# Patient Record
Sex: MALE | Race: WHITE | NOT HISPANIC OR LATINO | URBAN - METROPOLITAN AREA
[De-identification: names, ages, dates, MRNs, and addresses within clinical notes are randomized per-mention and may not be internally consistent; named-entity substitution may affect disease eponyms.]

---

## 2020-08-04 ENCOUNTER — OFFICE VISIT (OUTPATIENT)
Dept: CARDIAC SURGERY | Facility: CLINIC | Age: 45
End: 2020-08-04

## 2020-08-04 VITALS
WEIGHT: 176 LBS | HEIGHT: 68 IN | HEART RATE: 122 BPM | DIASTOLIC BLOOD PRESSURE: 84 MMHG | SYSTOLIC BLOOD PRESSURE: 134 MMHG | BODY MASS INDEX: 26.67 KG/M2 | OXYGEN SATURATION: 98 % | TEMPERATURE: 97.8 F

## 2020-08-04 DIAGNOSIS — I73.9 PVD (PERIPHERAL VASCULAR DISEASE) WITH CLAUDICATION (HCC): Primary | ICD-10-CM

## 2020-08-04 PROCEDURE — 99202 OFFICE O/P NEW SF 15 MIN: CPT | Performed by: THORACIC SURGERY (CARDIOTHORACIC VASCULAR SURGERY)

## 2020-08-04 RX ORDER — LISINOPRIL 10 MG/1
10 TABLET ORAL DAILY
COMMUNITY

## 2020-08-04 RX ORDER — CILOSTAZOL 100 MG/1
100 TABLET ORAL 2 TIMES DAILY
COMMUNITY

## 2020-08-04 NOTE — PROGRESS NOTES
08/04/2020  Patient Information  [facility]  [place] [address]   1975  'PCP/Referring Physician'  Gayla Blair APRN  [phone]  Gayla Blair, *  [phone]  Chief Complaint   Patient presents with   • Consult     Np referred for peripheral vascular disease,complains of constant bilateral leg pain,muscle cramps and numbness in feet.   • Peripheral Vascular Disease       History of Present Illness: 45-year-old  male with a history of hypertension, diabetes mellitus, right lower extremity DVT and active tobacco abuse who presents with bilateral lower extremity pain.  For the past 6 months, the patient notes worsening bilateral (R=L) crampy pain from the mid thigh down to the feet.  This pain is exacerbated with ambulating approximately 100 feet and alleviated with rest.  He has associated numbness in the bilateral feet.  Mr. Chow occasionally wakes up at night with lower extremity pain.  His primary care provider, DIAMOND Wright appropriately placed the patient on Pletal for his peripheral vascular disease and he has noticed some improvement.  The patient is tachycardic on presentation today and also notes occasional mild chest pain.      There is no problem list on file for this patient.    Past Medical History:   Diagnosis Date   • Deep vein thrombosis (CMS/HCC)     RIGHT LEG   • Diabetes mellitus (CMS/HCC)    • GERD (gastroesophageal reflux disease)    • Hypertension    • Peripheral vascular disease (CMS/HCC)      Past Surgical History:   Procedure Laterality Date   • NO PAST SURGERIES         Current Outpatient Medications:   •  cilostazol (PLETAL) 100 MG tablet, Take 100 mg by mouth 2 (Two) Times a Day., Disp: , Rfl:   •  lisinopril (PRINIVIL,ZESTRIL) 10 MG tablet, Take 10 mg by mouth Daily., Disp: , Rfl:   •  metFORMIN (GLUCOPHAGE) 500 MG tablet, Take 500 mg by  mouth 2 (Two) Times a Day With Meals., Disp: , Rfl:   Allergies no known allergies  Social History     Socioeconomic History   • Marital status: Single     Spouse name: Not on file   • Number of children: 0   • Years of education: Not on file   • Highest education level: Not on file   Occupational History   • Occupation: [employer]    Tobacco Use   • Smoking status: Current Every Day Smoker     Packs/day: 1.50     Years: 39.00     Pack years: 58.50     Types: Cigarettes   • Smokeless tobacco: Never Used   • Tobacco comment: STARTED SMOKING AT 6 YEARS OLD*HAS SMOKED UP TO 2 1/2 PPD   Substance and Sexual Activity   • Alcohol use: Never     Frequency: Never   • Drug use: Never   Social History Narrative    [facility] IN [facility]     Family History   Problem Relation Age of Onset   • Heart attack Mother    • COPD Father      Review of Systems   Constitution: Negative for chills, fever, malaise/fatigue, night sweats and weight loss.   HENT: Negative for hearing loss, odynophagia and sore throat.    Cardiovascular: Positive for claudication, dyspnea on exertion, near-syncope and palpitations. Negative for leg swelling and orthopnea.   Respiratory: Positive for cough and shortness of breath. Negative for hemoptysis.    Endocrine: Negative.  Negative for cold intolerance, heat intolerance, polydipsia, polyphagia and polyuria.   Hematologic/Lymphatic: Negative.  Does not bruise/bleed easily.   Skin: Positive for color change. Negative for itching and rash.   Musculoskeletal: Positive for back pain and muscle cramps. Negative for joint pain, joint swelling and myalgias.   Gastrointestinal: Positive for nausea and vomiting. Negative for abdominal pain, constipation, diarrhea, hematemesis, hematochezia and melena.   Genitourinary: Negative.  Negative for dysuria, frequency and hematuria.   Neurological: Positive for dizziness, light-headedness, loss of balance, numbness and weakness. Negative for focal weakness,  "headaches and seizures.   Psychiatric/Behavioral: Negative for suicidal ideas.   Allergic/Immunologic: Positive for environmental allergies.   All other systems reviewed and are negative.    Vitals:    08/04/20 0935   BP: 134/84   BP Location: Right arm   Patient Position: Sitting   Pulse: (!) 122   Temp: 97.8 °F (36.6 °C)   SpO2: 98%   Weight: 79.8 kg (176 lb)   Height: 172.7 cm (68\")      Physical Exam   Constitutional: He is oriented to person, place, and time. He appears well-developed and well-nourished. No distress.    male who appears stated age   HENT:   Head: Normocephalic and atraumatic.   Eyes: Conjunctivae are normal. No scleral icterus.   Neck: Normal range of motion. No JVD present. Carotid bruit is not present. No tracheal deviation present.   Cardiovascular: Regular rhythm and normal heart sounds. Exam reveals no gallop and no friction rub.   No murmur heard.  Pulses:       Femoral pulses are 2+ on the right side, and 2+ on the left side.       Popliteal pulses are 0 on the right side, and 0 on the left side.        Dorsalis pedis pulses are 0 on the right side, and 0 on the left side.        Posterior tibial pulses are 0 on the right side, and 0 on the left side.   Tachycardia.  Left dorsalis pedis and right posterior tibial (faint dorsalis pedis) Doppler signals are present.   Pulmonary/Chest: Effort normal and breath sounds normal. No stridor. No respiratory distress. He has no wheezes. He has no rales.   Abdominal: Soft. He exhibits no distension and no mass. There is no tenderness. There is no rebound and no guarding.   Musculoskeletal: Normal range of motion. He exhibits no edema.   Neurological: He is alert and oriented to person, place, and time.   Intact pedal motor function and sensation   Skin: Skin is warm and dry. No rash noted. He is not diaphoretic. No erythema.   No pedal erythema or ulcerations.   Psychiatric: He has a normal mood and affect. His behavior is normal. Judgment " and thought content normal.       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  The bilateral superficial femoral arteries are occluded.  The left distal superficial femoral artery reconstitutes in the right proximal popliteal artery reconstituted with three-vessel runoff to the bilateral ankles.    Assessment/Plan:  45-year-old  male with a history of hypertension, diabetes mellitus, right lower extremity DVT and active tobacco abuse who presents with bilateral lower extremity pain.  The patient has bilateral lower extremity claudication in the setting of peripheral vascular disease.  I discussed the importance of smoking cessation with the patient.  We also talked about a structured walking program.  I recommended the patient continue his Pletal for claudication and strict compliance with a structured walking program.  He should be started on lifelong aspirin, statin and beta blocker therapy.  I will have him return to the office in 2 months to evaluate his progress.  A cardiology referral will be made for his tachycardia and intermittent chest pain.  If the patient fails medical management of his peripheral vascular disease, a femoral to popliteal bypass can be considered in the future.    There is no problem list on file for this patient.

## 2020-08-13 DIAGNOSIS — Z00.6 EXAMINATION FOR NORMAL COMPARISON FOR CLINICAL RESEARCH: Primary | ICD-10-CM

## 2020-09-10 PROBLEM — Z72.0 TOBACCO ABUSE: Status: ACTIVE | Noted: 2020-09-10

## 2020-09-10 PROBLEM — I73.9 PAD (PERIPHERAL ARTERY DISEASE) (HCC): Status: ACTIVE | Noted: 2020-09-10

## 2020-09-10 PROBLEM — R07.2 PRECORDIAL PAIN: Status: ACTIVE | Noted: 2020-09-10

## 2020-09-10 PROBLEM — I10 HYPERTENSION: Status: ACTIVE | Noted: 2020-09-10

## 2020-09-10 PROBLEM — K21.9 GERD (GASTROESOPHAGEAL REFLUX DISEASE): Status: ACTIVE | Noted: 2020-09-10

## 2020-09-10 PROBLEM — R00.0 TACHYCARDIA: Status: ACTIVE | Noted: 2020-09-10

## 2020-09-10 PROBLEM — E11.9 DIABETES MELLITUS (HCC): Status: ACTIVE | Noted: 2020-09-10

## 2020-09-10 NOTE — PROGRESS NOTES
"      Subjective   Curt Chow is a 45 y.o. male.  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  The bilateral superficial femoral arteries are occluded.  The left distal superficial femoral artery reconstitutes in the right proximal popliteal artery reconstituted with three-vessel runoff to the bilateral ankles.     6 History of deep vein thrombosis (DVT) of lower extremity December 2019                   7 Diabetes mellitus (CMS/[facility])         8 Tobacco abuse         9 Claudication (CMS/[facility])         10 GERD (gastroesophageal reflux disease)           History of Present Illness   This is a 45-year-old hypertensive, diabetic male smoker recently evaluated for claudication and found to have significant PAD.  He was evaluated by CT surgery who have recommended a trial of medical management.  He was noted to be tachycardic and have occasional chest discomfort and was referred to our service for further evaluation.  He is a moderately poor historian but states it is \"heart runs too fast\".  He has occasional midsternal chest pain which occurs randomly.  He describes it as a pressure that is mild to moderate in intensity and not associated with shortness of breath, nausea, diaphoresis or radiating pain.  This may last 2 to 3 minutes and resolves spontaneously.  He has had no previous history of cardiac testing.  He does not check his blood pressures at home.  He is not on statin therapy and is unaware of his " "lipid status.  He reports having had a right lower extremity DVT this past December for which he was initially on warfarin.  His most recent primary care note shows discontinuation of warfarin and initiation of Xarelto which he is not currently taking.  He denies exertional chest pain or dyspnea.  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  States he can carry a gait without eliciting chest pain.  He further denies shortness of breath, orthopnea, PND or lower extremity edema.  He has occasional awareness of \"thumping\" in the chest which is self-limiting.  This is not associated with dizziness or syncope and is not noted to be irregular.  He currently smokes about a pack and a half of cigarettes per day.  His most recent hemoglobin A1c was 11.1.  The only other labs available for review are pro times.  He denies a history of thyroid disease.  He has never been on statin therapy.  He is not currently taking aspirin.    · Primary care note dated July 1, 2020 was reviewed.  Blood pressure that day was 138/90 with a heart rate of 116 bpm.  He presented with a complaint of leg pain.  He reported onset 3 days prior which she described as aching and cramping.  Symptoms were aggravated by ambulation.  He was noted to have weak pulses bilaterally.  Further evaluation included KAREN study and venous duplex studies of the lower lower extremities.  · CT surgery note dated 8/4/2020 was reviewed at which time he presented for follow-up of PAD and leg pain.  He noted 6 months of worsening bilateral crampy pain from the thigh to the feet.  He reported having pain with ambulation of greater than 100 feet.  He was noted to have been started on Pletal by primary care.  Blood pressure that day was 134/84 with a heart rate of 122 bpm.  His physical exam was significant for absence of popliteal, dorsalis pedis and posterior tibial pulses.  CTA of the aorta and lower extremities from 7/15/2020 was reviewed which showed complete occlusion " of the common femoral arteries bilaterally with patent profunda femoris.  There was reconstitution in the right proximal popliteal artery with three-vessel runoff to the bilateral ankles.  Lifelong aspirin statin and beta-blocker were recommended he was referred to our service for evaluation of tachycardia and intermittent chest pain.  A trial of medical management was recommended for initial treatment.    Past Surgical History:   Procedure Laterality Date   • NO PAST SURGERIES         The following portions of the patient's history were reviewed and updated as appropriate: allergies, current medications, past family history, past medical history, past social history, past surgical history and problem list.    No Known Allergies      Current Outpatient Medications:   •  cilostazol (PLETAL) 100 MG tablet, Take 100 mg by mouth 2 (Two) Times a Day., Disp: , Rfl:   •  lisinopril (PRINIVIL,ZESTRIL) 10 MG tablet, Take 10 mg by mouth Daily., Disp: , Rfl:   •  metFORMIN (GLUCOPHAGE) 500 MG tablet, Take 1,000 mg by mouth 2 (Two) Times a Day With Meals., Disp: , Rfl:     Review of Systems   Constitution: Negative.   HENT: Negative.    Eyes: Negative.    Cardiovascular: Positive for chest pain, claudication and palpitations. Negative for dyspnea on exertion, leg swelling, orthopnea, paroxysmal nocturnal dyspnea and syncope.   Respiratory: Positive for cough.    Endocrine: Negative.    Hematologic/Lymphatic: Negative.    Skin: Negative.    Musculoskeletal: Negative.    Gastrointestinal: Negative.    Genitourinary: Negative.    Neurological: Negative.    Psychiatric/Behavioral: Negative.    Allergic/Immunologic: Negative.    All other systems reviewed and are negative.      Social History     Socioeconomic History   • Marital status: Single     Spouse name: Not on file   • Number of children: 0   • Years of education: Not on file   • Highest education level: Not on file   Occupational History   • Occupation: [employer]   "  Tobacco Use   • Smoking status: Current Every Day Smoker     Packs/day: 1.50     Years: 39.00     Pack years: 58.50     Types: Cigarettes   • Smokeless tobacco: Never Used   • Tobacco comment: STARTED SMOKING AT 6 YEARS OLD*HAS SMOKED UP TO 2 1/2 PPD   Substance and Sexual Activity   • Alcohol use: Never     Frequency: Never   • Drug use: Never   Social History Narrative    LIVES IN [facility]       Family History   Problem Relation Age of Onset   • Heart attack Mother    • COPD Father        Objective      /70 (BP Location: Left arm, Patient Position: Sitting, Cuff Size: Adult)   Pulse 89   Temp 98 °F (36.7 °C)   Ht 172.7 cm (68\")   Wt 80.3 kg (177 lb)   SpO2 96%   BMI 26.91 kg/m²     Physical Exam   Constitutional: He is oriented to person, place, and time. He appears well-developed and well-nourished.   HENT:   Head: Normocephalic and atraumatic.   Mouth/Throat: Oropharynx is clear and moist.   Eyes: Pupils are equal, round, and reactive to light. EOM are normal. No scleral icterus.   Neck: Neck supple. No JVD present. No thyromegaly present.   Cardiovascular: Normal rate, regular rhythm and normal heart sounds. Exam reveals no gallop and no friction rub.   No murmur heard.  Pulmonary/Chest: No stridor. He has wheezes. He has no rales.   Mild expiratory wheeze left lower lobe.  Otherwise clear to auscultation bilaterally   Abdominal: Soft. Bowel sounds are normal. He exhibits no distension and no mass. There is no tenderness.   Musculoskeletal: Normal range of motion. He exhibits no edema, tenderness or deformity.   Lymphadenopathy:     He has no cervical adenopathy.   Neurological: He is alert and oriented to person, place, and time. No cranial nerve deficit. Coordination normal.   Skin: Skin is warm and dry. No rash noted. No erythema.   Psychiatric: He has a normal mood and affect.   Vitals reviewed.        ECG 12 Lead  Date/Time: 9/10/2020 12:55 PM  Performed by: Anurag Gamble, " PA  Authorized by: Anurag Gamble PA   Previous ECG: no previous ECG available  Rhythm: sinus rhythm  Ectopy: unifocal PVCs  Rate: normal  BPM: 89  Conduction: incomplete right bundle branch block  ST Segments: ST segments normal  T Waves: T waves normal  QRS axis: right  Other: no other findings    Clinical impression: abnormal EKG            Lab Review:         Assessment:     Diagnosis Plan   1. Tachycardia  Adult Transthoracic Echo Complete W/ Cont if Necessary Per Protocol    Holter Monitor - 72 Hour Up To 21 Days    Comprehensive metabolic panel    CBC (No Diff)    Thyroid Panel With TSH   2. Abnormal EKG  Stress Test With Myocardial Perfusion (1 Day)    Adult Transthoracic Echo Complete W/ Cont if Necessary Per Protocol    Holter Monitor - 72 Hour Up To 21 Days  I have recommended he start enteric-coated aspirin 81 mg daily   3. Essential hypertension   well managed on current medical regimen.  I am deferring starting him on beta-blockade at this time partially because his heart rate is within normal limits today and partially because I like him to wear a monitor for a week.   4. Precordial pain  Stress Test With Myocardial Perfusion (1 Day)    Adult Transthoracic Echo Complete W/ Cont if Necessary Per Protocol   5. PAD (peripheral artery disease) (CMS/HCC)  Lipid panel.  He should be on empiric high-dose statin due to diabetes and established peripheral artery disease          Plan:        Continue current medications.   in 1 month, sooner as needed.  Thank you for allowing us to participate in the care of your patient.             Scribed for Victor Hugo Moreau MD by Electronically signed by Electronically signed by RYANNE Olivier, 09/11/20, 12:50 PM.

## 2020-09-11 ENCOUNTER — LAB (OUTPATIENT)
Dept: LAB | Facility: HOSPITAL | Age: 45
End: 2020-09-11

## 2020-09-11 ENCOUNTER — CONSULT (OUTPATIENT)
Dept: CARDIOLOGY | Facility: CLINIC | Age: 45
End: 2020-09-11

## 2020-09-11 VITALS
HEART RATE: 89 BPM | DIASTOLIC BLOOD PRESSURE: 70 MMHG | SYSTOLIC BLOOD PRESSURE: 122 MMHG | OXYGEN SATURATION: 96 % | BODY MASS INDEX: 26.83 KG/M2 | TEMPERATURE: 98 F | WEIGHT: 177 LBS | HEIGHT: 68 IN

## 2020-09-11 DIAGNOSIS — I73.9 PAD (PERIPHERAL ARTERY DISEASE) (HCC): ICD-10-CM

## 2020-09-11 DIAGNOSIS — R00.0 TACHYCARDIA: Primary | ICD-10-CM

## 2020-09-11 DIAGNOSIS — I10 ESSENTIAL HYPERTENSION: ICD-10-CM

## 2020-09-11 DIAGNOSIS — R00.0 TACHYCARDIA: ICD-10-CM

## 2020-09-11 DIAGNOSIS — R07.2 PRECORDIAL PAIN: ICD-10-CM

## 2020-09-11 DIAGNOSIS — R94.31 ABNORMAL EKG: ICD-10-CM

## 2020-09-11 PROBLEM — I82.409 DEEP VEIN THROMBOSIS (HCC): Status: ACTIVE | Noted: 2020-09-11

## 2020-09-11 PROBLEM — Z86.718 HISTORY OF DEEP VEIN THROMBOSIS (DVT) OF LOWER EXTREMITY: Status: ACTIVE | Noted: 2020-09-11

## 2020-09-11 PROCEDURE — 99204 OFFICE O/P NEW MOD 45 MIN: CPT | Performed by: INTERNAL MEDICINE

## 2020-09-11 PROCEDURE — 85027 COMPLETE CBC AUTOMATED: CPT | Performed by: PHYSICIAN ASSISTANT

## 2020-09-11 PROCEDURE — 36415 COLL VENOUS BLD VENIPUNCTURE: CPT | Performed by: INTERNAL MEDICINE

## 2020-09-11 PROCEDURE — 84436 ASSAY OF TOTAL THYROXINE: CPT

## 2020-09-11 PROCEDURE — 84479 ASSAY OF THYROID (T3 OR T4): CPT

## 2020-09-11 PROCEDURE — 84443 ASSAY THYROID STIM HORMONE: CPT

## 2020-09-11 PROCEDURE — 80061 LIPID PANEL: CPT

## 2020-09-11 PROCEDURE — 93000 ELECTROCARDIOGRAM COMPLETE: CPT | Performed by: INTERNAL MEDICINE

## 2020-09-11 PROCEDURE — 80053 COMPREHEN METABOLIC PANEL: CPT

## 2020-09-11 RX ORDER — ASPIRIN 81 MG/1
81 TABLET ORAL DAILY
Start: 2020-09-11

## 2020-09-12 LAB
ALBUMIN SERPL-MCNC: 4.1 G/DL (ref 3.5–5.2)
ALBUMIN/GLOB SERPL: 1.1 G/DL
ALP SERPL-CCNC: 134 U/L (ref 39–117)
ALT SERPL W P-5'-P-CCNC: 16 U/L (ref 1–41)
ANION GAP SERPL CALCULATED.3IONS-SCNC: 11.3 MMOL/L (ref 5–15)
AST SERPL-CCNC: 11 U/L (ref 1–40)
BILIRUB SERPL-MCNC: <0.2 MG/DL (ref 0–1.2)
BUN SERPL-MCNC: 10 MG/DL (ref 6–20)
BUN/CREAT SERPL: 12 (ref 7–25)
CALCIUM SPEC-SCNC: 10.1 MG/DL (ref 8.6–10.5)
CHLORIDE SERPL-SCNC: 103 MMOL/L (ref 98–107)
CHOLEST SERPL-MCNC: 189 MG/DL (ref 0–200)
CO2 SERPL-SCNC: 25.7 MMOL/L (ref 22–29)
CREAT SERPL-MCNC: 0.83 MG/DL (ref 0.76–1.27)
DEPRECATED RDW RBC AUTO: 45.2 FL (ref 37–54)
ERYTHROCYTE [DISTWIDTH] IN BLOOD BY AUTOMATED COUNT: 13.1 % (ref 12.3–15.4)
GFR SERPL CREATININE-BSD FRML MDRD: 99 ML/MIN/1.73
GLOBULIN UR ELPH-MCNC: 3.6 GM/DL
GLUCOSE SERPL-MCNC: 128 MG/DL (ref 65–99)
HCT VFR BLD AUTO: 50.7 % (ref 37.5–51)
HDLC SERPL-MCNC: 50 MG/DL (ref 40–60)
HGB BLD-MCNC: 16.8 G/DL (ref 13–17.7)
LDLC SERPL CALC-MCNC: 106 MG/DL (ref 0–100)
LDLC/HDLC SERPL: 2.13 {RATIO}
MCH RBC QN AUTO: 31 PG (ref 26.6–33)
MCHC RBC AUTO-ENTMCNC: 33.1 G/DL (ref 31.5–35.7)
MCV RBC AUTO: 93.5 FL (ref 79–97)
PLATELET # BLD AUTO: 335 10*3/MM3 (ref 140–450)
PMV BLD AUTO: 9.7 FL (ref 6–12)
POTASSIUM SERPL-SCNC: 4.4 MMOL/L (ref 3.5–5.2)
PROT SERPL-MCNC: 7.7 G/DL (ref 6–8.5)
RBC # BLD AUTO: 5.42 10*6/MM3 (ref 4.14–5.8)
SODIUM SERPL-SCNC: 140 MMOL/L (ref 136–145)
T-UPTAKE NFR SERPL: 1.24 TBI (ref 0.8–1.3)
T4 SERPL-MCNC: 6.45 MCG/DL (ref 4.5–11.7)
TRIGL SERPL-MCNC: 163 MG/DL (ref 0–150)
TSH SERPL DL<=0.05 MIU/L-ACNC: 3.32 UIU/ML (ref 0.27–4.2)
VLDLC SERPL-MCNC: 32.6 MG/DL (ref 5–40)
WBC # BLD AUTO: 10.65 10*3/MM3 (ref 3.4–10.8)

## 2020-12-01 ENCOUNTER — TELEPHONE (OUTPATIENT)
Dept: CARDIAC SURGERY | Facility: CLINIC | Age: 45
End: 2020-12-01

## 2021-01-05 ENCOUNTER — TELEPHONE (OUTPATIENT)
Dept: CARDIAC SURGERY | Facility: CLINIC | Age: 46
End: 2021-01-05